# Patient Record
(demographics unavailable — no encounter records)

---
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
## 2025-02-25 NOTE — ADDENDUM
[FreeTextEntry1] : Addendum (Theresa; 2/25/25): I reviewed Ms. Shepherd's prior records from Hematology; Dr. Thomas, last in 8/2020. Prolonged anticoagulation was recommended, most recently with Rivaroxaban. Thrombophilia evaluation was obtained and reportedly negative.

## 2025-02-25 NOTE — REVIEW OF SYSTEMS
[Negative] : Endocrine [TextBox_3] : Fatigue [TextBox_30] : Left pleuritic chest pain [TextBox_104] : LLE injury healing

## 2025-02-25 NOTE — ASSESSMENT
[FreeTextEntry1] : Labs: 4/2024 WBC 5.48, Hgb 12.8, Plts 306 Na 140, K 4.1, Cl 105, HCO3 23, BUN/creat 19/0.61, glucose 96  Radiology: Chest CT 2018: Linear opacity, local pleural thickening Chest CT 1/2024: New 10x6 RUL solid, tubular pulmonary nodule  Chest CT (3/2024): IMPRESSION: 1. Tubular shaped subpleural solid nodule within the lateral aspect of the apical segment of the right upper lobe with an average diameter 8 mm (5:88). Unchanged from 1/21/2024 allowing for differences in imaging technique however more conspicuous in size when compared to 12/23/2018 and new from examination dated 12/7/2016. Further evaluation to include whole-body PET/CT and histological sampling. Minimally short interval surveillance in 3 months is suggested. 2. Stable clustered nodules the largest measuring 5 mm unchanged from 12/23/2018.  PET 4/8/2024 IMPRESSION: 1. Previously identified right upper lobe pulmonary nodule is below the resolution of PET imaging and demonstrates no FDG avidity. 2. Nonspecific prominence of the bilateral inguinal lymph nodes with mild FDG avidity (SUV max 2.3). Limited differential includes infectious/inflammatory and neoplastic etiologies. 3. Mild left maxillary mucosal thickening with moderate FDG avidity, probable chronic sinusitis.  Inguinal US (7/2024): IMPRESSION: Enlarged bilateral inguinal lymph nodes with benign morphology, likely reactive.  Chest CT (10/2024): IMPRESSION: 1.  Since 3/15/2024, there has been no significant change in lung nodules which were not FDG avid on PET scan from 3/28/2024. The right upper lobe nodule could be a true nodule or scar. Recommend follow-up chest CT in 6-12 months to ensure continued stability. 2.  There is a new incompletely visualized density within the left breast. This could represent a clip or calcification. Clinical correlation recommended. Can obtain dedicated imaging of the breasts as clinically indicated.   Chest CT (12/2024): IMPRESSION:  1. Since 10/1/2024, no significant change 8 mm nodular opacity right upper lobe with curvilinear appearance on coronal and sagittal images. This may again represent a focus of scar rather than a true nodule.  2. No change cluster of small tree-in-bud nodules left lower lobe, likely of infectious/inflammatory etiology.   CTA (2/2025): FINDINGS: AIRWAYS/LUNGS/PLEURA: New left apical groundglass opacities with internal septal thickening. No consolidation. No change 8 mm nodular opacity in the lateral aspect of the right upper lobe (6:94). No change cluster of tree-in-bud nodules in the anteromedial basal segment of left lower lobe (6:149). New small left pleural effusion. No right pleural effusion. MEDIASTINUM AND EBER: No lymphadenopathy. PULMONARY ANGIOGRAM: Bilateral pulmonary emboli, in the right interlobular artery arteries extending distally into the middle and lower lobes, right upper lobe segmental/subsegmental arteries, as well as in the distal left pulmonary artery extending distally into the upper and lower lobe segmental arteries. Normal caliber of main pulmonary artery. VESSELS: Aortic calcifications. Coronary artery calcifications. HEART: Dilated right ventricle reflecting right heart strain. No pericardial effusion. VISUALIZED UPPER ABDOMEN: A few hypodensities again seen in left kidney. CHEST WALL AND BONES: Punctate calcification right lobe of thyroid. Punctate clip or calcification in the left breast, unchanged. Degenerative changes.  IMPRESSION: Bilateral pulmonary emboli with evidence of right heart strain. Left apical groundglass opacities with internal septal thickening may be secondary to pulmonary hemorrhage/infarction as well as other etiologies. New small left pleural effusion.  TTE (2/2025): CONCLUSIONS: 1. Normal left and right ventricular size and systolic function. 2. Mild symmetric left ventricular hypertrophy. 3. Grade I left ventricular diastolic dysfunction. 4. Mild tricuspid regurgitation. 5. Pulmonary artery systolic pressure is 27 mmHg. 6. No pericardial effusion.   PFTs Not yet obtained  A/P: 77 yo F h/o recurrent DVT/PE (submassive PE in 2/2025), Psoriatic arthritis, Raynaud's Hypothyroidism, JAMIE, and GERD returns to clinic after a recent hospitalization.  We discussed that Ms. Shepherd  needs to remain on lifelong anticoagulation unless she develops side effects. Due to her recurrent VTE, I think a Hematology evaluation is indicated. I would not expect a treatment change unless Antiphospholipid syndrome is discovered. For secondary evaluation of VTE, she reports being up-to-date on her mammogram. We obtained a PET/CT in 2024. She has not obtained a colonoscopy in the last 10 years. She reports having a sleep study in the past due to fatigue; she does not remember the results.  For the submassive PE and h/o chronic DVT, we will obtain a repeat TTE and CTPE in 3 months to ensure clot resolution and normal RV function.   For her pleuritic chest pain today, I suspect recent musculoskeletal injury or resolving left-sided pleural effusion (likely from pulmonary infarction during her hospitalization). I would like to ensure the effusion is not enlarging or that she has not developed pneumonia.  Ms. Shepherd's pulmonary nodule is stable. Given stability at 3 months, 6 months, and negative PET/CT, I recommended a CT in 1 year.  1. Recurrent DVT/PE 2. Submassive PE in 2/2025 3. RUL solid, incidental pulmonary nodule: low risk for malignancy 4. Inguinal lymphadenopathy in the setting of recent trauma and chronic LE edema 5. Obesity   -continue Apixaban 5 mg PO BID -Hematology consult -Home sleep study -CTPE and TTE in 3 months -CXR -Future visit: GI evaluation for colonoscopy; chest CT 10/2025 -Vaccinations: not discussed today -follow-up with me in 4 weeks
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
## 2025-02-25 NOTE — ASSESSMENT
[FreeTextEntry1] : Labs: 4/2024 WBC 5.48, Hgb 12.8, Plts 306 Na 140, K 4.1, Cl 105, HCO3 23, BUN/creat 19/0.61, glucose 96  Radiology: Chest CT 2018: Linear opacity, local pleural thickening Chest CT 1/2024: New 10x6 RUL solid, tubular pulmonary nodule  Chest CT (3/2024): IMPRESSION: 1. Tubular shaped subpleural solid nodule within the lateral aspect of the apical segment of the right upper lobe with an average diameter 8 mm (5:88). Unchanged from 1/21/2024 allowing for differences in imaging technique however more conspicuous in size when compared to 12/23/2018 and new from examination dated 12/7/2016. Further evaluation to include whole-body PET/CT and histological sampling. Minimally short interval surveillance in 3 months is suggested. 2. Stable clustered nodules the largest measuring 5 mm unchanged from 12/23/2018.  PET 4/8/2024 IMPRESSION: 1. Previously identified right upper lobe pulmonary nodule is below the resolution of PET imaging and demonstrates no FDG avidity. 2. Nonspecific prominence of the bilateral inguinal lymph nodes with mild FDG avidity (SUV max 2.3). Limited differential includes infectious/inflammatory and neoplastic etiologies. 3. Mild left maxillary mucosal thickening with moderate FDG avidity, probable chronic sinusitis.  Inguinal US (7/2024): IMPRESSION: Enlarged bilateral inguinal lymph nodes with benign morphology, likely reactive.  Chest CT (10/2024): IMPRESSION: 1.  Since 3/15/2024, there has been no significant change in lung nodules which were not FDG avid on PET scan from 3/28/2024. The right upper lobe nodule could be a true nodule or scar. Recommend follow-up chest CT in 6-12 months to ensure continued stability. 2.  There is a new incompletely visualized density within the left breast. This could represent a clip or calcification. Clinical correlation recommended. Can obtain dedicated imaging of the breasts as clinically indicated.   Chest CT (12/2024): IMPRESSION:  1. Since 10/1/2024, no significant change 8 mm nodular opacity right upper lobe with curvilinear appearance on coronal and sagittal images. This may again represent a focus of scar rather than a true nodule.  2. No change cluster of small tree-in-bud nodules left lower lobe, likely of infectious/inflammatory etiology.   CTA (2/2025): FINDINGS: AIRWAYS/LUNGS/PLEURA: New left apical groundglass opacities with internal septal thickening. No consolidation. No change 8 mm nodular opacity in the lateral aspect of the right upper lobe (6:94). No change cluster of tree-in-bud nodules in the anteromedial basal segment of left lower lobe (6:149). New small left pleural effusion. No right pleural effusion. MEDIASTINUM AND EBER: No lymphadenopathy. PULMONARY ANGIOGRAM: Bilateral pulmonary emboli, in the right interlobular artery arteries extending distally into the middle and lower lobes, right upper lobe segmental/subsegmental arteries, as well as in the distal left pulmonary artery extending distally into the upper and lower lobe segmental arteries. Normal caliber of main pulmonary artery. VESSELS: Aortic calcifications. Coronary artery calcifications. HEART: Dilated right ventricle reflecting right heart strain. No pericardial effusion. VISUALIZED UPPER ABDOMEN: A few hypodensities again seen in left kidney. CHEST WALL AND BONES: Punctate calcification right lobe of thyroid. Punctate clip or calcification in the left breast, unchanged. Degenerative changes.  IMPRESSION: Bilateral pulmonary emboli with evidence of right heart strain. Left apical groundglass opacities with internal septal thickening may be secondary to pulmonary hemorrhage/infarction as well as other etiologies. New small left pleural effusion.  TTE (2/2025): CONCLUSIONS: 1. Normal left and right ventricular size and systolic function. 2. Mild symmetric left ventricular hypertrophy. 3. Grade I left ventricular diastolic dysfunction. 4. Mild tricuspid regurgitation. 5. Pulmonary artery systolic pressure is 27 mmHg. 6. No pericardial effusion.   PFTs Not yet obtained  A/P: 79 yo F h/o recurrent DVT/PE (submassive PE in 2/2025), Psoriatic arthritis, Raynaud's Hypothyroidism, JAMIE, and GERD returns to clinic after a recent hospitalization.  We discussed that Ms. Shepherd  needs to remain on lifelong anticoagulation unless she develops side effects. Due to her recurrent VTE, I think a Hematology evaluation is indicated. I would not expect a treatment change unless Antiphospholipid syndrome is discovered. For secondary evaluation of VTE, she reports being up-to-date on her mammogram. We obtained a PET/CT in 2024. She has not obtained a colonoscopy in the last 10 years. She reports having a sleep study in the past due to fatigue; she does not remember the results.  For the submassive PE and h/o chronic DVT, we will obtain a repeat TTE and CTPE in 3 months to ensure clot resolution and normal RV function.   For her pleuritic chest pain today, I suspect recent musculoskeletal injury or resolving left-sided pleural effusion (likely from pulmonary infarction during her hospitalization). I would like to ensure the effusion is not enlarging or that she has not developed pneumonia.  Ms. Shepherd's pulmonary nodule is stable. Given stability at 3 months, 6 months, and negative PET/CT, I recommended a CT in 1 year.  1. Recurrent DVT/PE 2. Submassive PE in 2/2025 3. RUL solid, incidental pulmonary nodule: low risk for malignancy 4. Inguinal lymphadenopathy in the setting of recent trauma and chronic LE edema 5. Obesity   -continue Apixaban 5 mg PO BID -Hematology consult -Home sleep study -CTPE and TTE in 3 months -CXR -Future visit: GI evaluation for colonoscopy; chest CT 10/2025 -Vaccinations: not discussed today -follow-up with me in 4 weeks

## 2025-02-26 NOTE — HISTORY OF PRESENT ILLNESS
[de-identified] : 78F with hx h/o femoral DVT and PE in 2016 (not on AC) with reccurent hx 02/2025, Psoriatic arthritis, Raynaud's, hypothyroidism, chronic fatigue, JAMIE (recent sleep study at [place]), GERD, goiter, UTI.  Symptoms 2 weeks prior to [facility] visit 2 weeks ago 2016- prior with sx of back pain. No SOB, chest pain, palpitations. Report chronic hx of b.l leg pain s/p Eliquis. Wil recurrent episode 2019. Diagnosed with thrombophlebitis 1971 after puncture wound.on AC - heparin for 3 weeks Hx of recurrent DVT/PEs. No hx of miscarriages.   Medications: Eliquis, (RX by Dr. Cardenas) Allergies: amoxicillin, celebrex, compazine, Enbrel, Humira Social hx: No tobacco use, social alcohol use Family hx: Mother (CAD; unsure hx of DVT) Surgery: dental extractions, tonsillectomy, appendectomy

## 2025-03-01 NOTE — REVIEW OF SYSTEMS
[Negative] : Endocrine [TextBox_3] : Fatigue [TextBox_30] : Left pleuritic chest pain [TextBox_94] : Left lower leg and foot swelling and pain.

## 2025-03-01 NOTE — ASSESSMENT
[FreeTextEntry1] : Labs: 4/2024 WBC 5.48, Hgb 12.8, Plts 306 Na 140, K 4.1, Cl 105, HCO3 23, BUN/creat 19/0.61, glucose 96  Radiology: Chest CT 2018: Linear opacity, local pleural thickening Chest CT 1/2024: New 10x6 RUL solid, tubular pulmonary nodule  Chest CT (3/2024): IMPRESSION: 1. Tubular shaped subpleural solid nodule within the lateral aspect of the apical segment of the right upper lobe with an average diameter 8 mm (5:88). Unchanged from 1/21/2024 allowing for differences in imaging technique however more conspicuous in size when compared to 12/23/2018 and new from examination dated 12/7/2016. Further evaluation to include whole-body PET/CT and histological sampling. Minimally short interval surveillance in 3 months is suggested. 2. Stable clustered nodules the largest measuring 5 mm unchanged from 12/23/2018.  PET 4/8/2024 IMPRESSION: 1. Previously identified right upper lobe pulmonary nodule is below the resolution of PET imaging and demonstrates no FDG avidity. 2. Nonspecific prominence of the bilateral inguinal lymph nodes with mild FDG avidity (SUV max 2.3). Limited differential includes infectious/inflammatory and neoplastic etiologies. 3. Mild left maxillary mucosal thickening with moderate FDG avidity, probable chronic sinusitis.  Inguinal US (7/2024): IMPRESSION: Enlarged bilateral inguinal lymph nodes with benign morphology, likely reactive.  Chest CT (10/2024): IMPRESSION: 1.  Since 3/15/2024, there has been no significant change in lung nodules which were not FDG avid on PET scan from 3/28/2024. The right upper lobe nodule could be a true nodule or scar. Recommend follow-up chest CT in 6-12 months to ensure continued stability. 2.  There is a new incompletely visualized density within the left breast. This could represent a clip or calcification. Clinical correlation recommended. Can obtain dedicated imaging of the breasts as clinically indicated.  Chest CT (12/2024): IMPRESSION:  1. Since 10/1/2024, no significant change 8 mm nodular opacity right upper lobe with curvilinear appearance on coronal and sagittal images. This may again represent a focus of scar rather than a true nodule.  2. No change cluster of small tree-in-bud nodules left lower lobe, likely of infectious/inflammatory etiology.   CTA (2/2025): FINDINGS: AIRWAYS/LUNGS/PLEURA: New left apical groundglass opacities with internal septal thickening. No consolidation. No change 8 mm nodular opacity in the lateral aspect of the right upper lobe (6:94). No change cluster of tree-in-bud nodules in the anteromedial basal segment of left lower lobe (6:149). New small left pleural effusion. No right pleural effusion. MEDIASTINUM AND EBER: No lymphadenopathy. PULMONARY ANGIOGRAM: Bilateral pulmonary emboli, in the right interlobular artery arteries extending distally into the middle and lower lobes, right upper lobe segmental/subsegmental arteries, as well as in the distal left pulmonary artery extending distally into the upper and lower lobe segmental arteries. Normal caliber of main pulmonary artery. VESSELS: Aortic calcifications. Coronary artery calcifications. HEART: Dilated right ventricle reflecting right heart strain. No pericardial effusion. VISUALIZED UPPER ABDOMEN: A few hypodensities again seen in left kidney. CHEST WALL AND BONES: Punctate calcification right lobe of thyroid. Punctate clip or calcification in the left breast, unchanged. Degenerative changes.  IMPRESSION: Bilateral pulmonary emboli with evidence of right heart strain. Left apical groundglass opacities with internal septal thickening may be secondary to pulmonary hemorrhage/infarction as well as other etiologies. New small left pleural effusion.  TTE (2/2025): CONCLUSIONS: 1. Normal left and right ventricular size and systolic function. 2. Mild symmetric left ventricular hypertrophy. 3. Grade I left ventricular diastolic dysfunction. 4. Mild tricuspid regurgitation. 5. Pulmonary artery systolic pressure is 27 mmHg. 6. No pericardial effusion.   PFTs Not yet obtained  A/P: 77 yo F h/o recurrent DVT/PE (submassive PE, intermediate-low risk; 2/2025), Psoriatic arthritis, Raynaud's Hypothyroidism, JAMIE, and GERD returns to clinic after a recent hospitalization.  Ms. Shepherd inquired about bunion surgery due to L foot pain. We discussed that she should not interrupt her anticoagulation for at least 3 months to ensure that her DVT and PEs resolve. I agree with her evaluation by a podiatrist.   Ms. Shepherd saw Dr. Ramirez, who recommended against hypercoagulability testing now due to her concurrent anticoagulation. She agreed with lifelong anticoagulation and monitoring for side effects.  For the submassive PE and h/o chronic DVT, we will obtain a repeat TTE and CTPE in 3 months to ensure clot resolution and normal RV function.   Ms. Shepherd's pulmonary nodule is stable. Given stability at 3 months, 6 months, and negative PET/CT, we will obtain a chest CT in 1 year (2/2026).  1. Recurrent DVT/PE 2. Submassive PE in 2/2025 3. RUL solid, incidental pulmonary nodule: low risk for malignancy 4. Inguinal lymphadenopathy in the setting of recent trauma and chronic LE edema 5. Housing instability  -appreciate Hematology consult -continue Apixaban 5 mg PO BID; I provided 2 weeks of medication today (while she is awaiting a patient assistant program) -Home sleep study -CTPE and TTE in 3 months (5/2025) -Future visit: GI evaluation for colonoscopy; chest CT in 1 year (after CTPE) to monitor lung nodule -Vaccinations: not discussed today -follow-up with me on 3/19/25

## 2025-03-01 NOTE — PHYSICAL EXAM
[No Acute Distress] : no acute distress [Normal Appearance] : normal appearance [Normal Rate/Rhythm] : normal rate/rhythm [No Murmurs] : no murmurs [Normal S1, S2] : normal s1, s2 [No Resp Distress] : no resp distress [Clear to Auscultation Bilaterally] : clear to auscultation bilaterally [No Abnormalities] : no abnormalities [Benign] : benign [Normal Gait] : normal gait [No Clubbing] : no clubbing [No Cyanosis] : no cyanosis [FROM] : FROM [Normal Color/ Pigmentation] : normal color/ pigmentation [No Focal Deficits] : no focal deficits [Oriented x3] : oriented x3 [Normal Affect] : normal affect [TextBox_11] : NC/AT [TextBox_105] : L>R lower extremity edema
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
## 2025-03-21 NOTE — ASSESSMENT
[FreeTextEntry1] : Labs: 4/2024 WBC 5.48, Hgb 12.8, Plts 306 Na 140, K 4.1, Cl 105, HCO3 23, BUN/creat 19/0.61, glucose 96  Radiology: Chest CT 2018: Linear opacity, local pleural thickening Chest CT 1/2024: New 10x6 RUL solid, tubular pulmonary nodule  Chest CT (3/2024): IMPRESSION: 1. Tubular shaped subpleural solid nodule within the lateral aspect of the apical segment of the right upper lobe with an average diameter 8 mm (5:88). Unchanged from 1/21/2024 allowing for differences in imaging technique however more conspicuous in size when compared to 12/23/2018 and new from examination dated 12/7/2016. Further evaluation to include whole-body PET/CT and histological sampling. Minimally short interval surveillance in 3 months is suggested. 2. Stable clustered nodules the largest measuring 5 mm unchanged from 12/23/2018.  PET 4/8/2024 IMPRESSION: 1. Previously identified right upper lobe pulmonary nodule is below the resolution of PET imaging and demonstrates no FDG avidity. 2. Nonspecific prominence of the bilateral inguinal lymph nodes with mild FDG avidity (SUV max 2.3). Limited differential includes infectious/inflammatory and neoplastic etiologies. 3. Mild left maxillary mucosal thickening with moderate FDG avidity, probable chronic sinusitis.  Inguinal US (7/2024): IMPRESSION: Enlarged bilateral inguinal lymph nodes with benign morphology, likely reactive.  Chest CT (10/2024): IMPRESSION: 1.  Since 3/15/2024, there has been no significant change in lung nodules which were not FDG avid on PET scan from 3/28/2024. The right upper lobe nodule could be a true nodule or scar. Recommend follow-up chest CT in 6-12 months to ensure continued stability. 2.  There is a new incompletely visualized density within the left breast. This could represent a clip or calcification. Clinical correlation recommended. Can obtain dedicated imaging of the breasts as clinically indicated.  Chest CT (12/2024): IMPRESSION:  1. Since 10/1/2024, no significant change 8 mm nodular opacity right upper lobe with curvilinear appearance on coronal and sagittal images. This may again represent a focus of scar rather than a true nodule.  2. No change cluster of small tree-in-bud nodules left lower lobe, likely of infectious/inflammatory etiology.   CTA (2/2025): FINDINGS: AIRWAYS/LUNGS/PLEURA: New left apical groundglass opacities with internal septal thickening. No consolidation. No change 8 mm nodular opacity in the lateral aspect of the right upper lobe (6:94). No change cluster of tree-in-bud nodules in the anteromedial basal segment of left lower lobe (6:149). New small left pleural effusion. No right pleural effusion. IMPRESSION: Bilateral pulmonary emboli with evidence of right heart strain. Left apical groundglass opacities with internal septal thickening may be secondary to pulmonary hemorrhage/infarction as well as other etiologies. New small left pleural effusion.  TTE (2/2025): CONCLUSIONS: 1. Normal left and right ventricular size and systolic function. 2. Mild symmetric left ventricular hypertrophy. 3. Grade I left ventricular diastolic dysfunction. 4. Mild tricuspid regurgitation. 5. Pulmonary artery systolic pressure is 27 mmHg. 6. No pericardial effusion.   PFTs Not yet obtained  A/P: 79 yo F h/o recurrent DVT/PE (submassive PE, intermediate-low risk; 2/2025), Psoriatic arthritis, Raynaud's Hypothyroidism, JAMIE, and GERD returns to clinic after a recent hospitalization.  Ms. Shepherd feels well today. She does not remember her last colonoscopy. We discussed that colon cancer screening is recommended since GI bleeding can be associated with colon tumors. She is re-establishing primary care at [facility] and will consider.   For her submassive PE, she has had difficulty obtaining and affording Apixaban. [facility] be able to support her.   Ms. Shepherd saw Dr. Ramirez, who recommended against hypercoagulability testing now due to her concurrent anticoagulation. She agreed with lifelong anticoagulation and monitoring for side effects.  Ms. Shepherd's pulmonary nodule is stable. Given stability at 3 months, 6 months, and negative PET/CT, we will obtain a chest CT in 1 year (2/2026).  1. Recurrent DVT/PE 2. Submassive PE in 2/2025 3. RUL solid, incidental pulmonary nodule: low risk for malignancy 4. Inguinal lymphadenopathy in the setting of recent trauma and chronic LE edema 5. Housing instability  -appreciate Hematology consult -continue Apixaban 5 mg PO BID; I provided 2 weeks of medication today (while she is awaiting a patient assistant program); samples for 2 weeks were provided -repeat chest CT and TTE in 5/2025 -Home sleep study -Future visit: GI evaluation for colonoscopy; chest CT in 1 year (after CTPE) to monitor lung nodule -Vaccinations: not discussed today -follow-up with Dr. Fallon in 3 months

## 2025-03-21 NOTE — PHYSICAL EXAM
[No Acute Distress] : no acute distress [Normal Appearance] : normal appearance [Normal Rate/Rhythm] : normal rate/rhythm [Normal S1, S2] : normal s1, s2 [No Murmurs] : no murmurs [No Resp Distress] : no resp distress [Clear to Auscultation Bilaterally] : clear to auscultation bilaterally [No Abnormalities] : no abnormalities [Benign] : benign [Normal Gait] : normal gait [No Clubbing] : no clubbing [No Cyanosis] : no cyanosis [FROM] : FROM [Normal Color/ Pigmentation] : normal color/ pigmentation [No Focal Deficits] : no focal deficits [Oriented x3] : oriented x3 [Normal Affect] : normal affect [TextBox_11] : NC/AT [TextBox_105] : L>R lower extremity edema
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
## 2025-03-21 NOTE — REVIEW OF SYSTEMS
Patient will come out [Negative] : Endocrine [TextBox_3] : Fatigue [TextBox_94] : Left lower leg and foot swelling and pain.

## 2025-04-05 NOTE — ASSESSMENT
[FreeTextEntry1] : Labs: 4/2024 WBC 5.48, Hgb 12.8, Plts 306 Na 140, K 4.1, Cl 105, HCO3 23, BUN/creat 19/0.61, glucose 96  Radiology: Chest CT 2018: Linear opacity, local pleural thickening Chest CT 1/2024: New 10x6 RUL solid, tubular pulmonary nodule  Chest CT (3/2024): IMPRESSION: 1. Tubular shaped subpleural solid nodule within the lateral aspect of the apical segment of the right upper lobe with an average diameter 8 mm (5:88). Unchanged from 1/21/2024 allowing for differences in imaging technique however more conspicuous in size when compared to 12/23/2018 and new from examination dated 12/7/2016. Further evaluation to include whole-body PET/CT and histological sampling. Minimally short interval surveillance in 3 months is suggested. 2. Stable clustered nodules the largest measuring 5 mm unchanged from 12/23/2018.  PET 4/8/2024 IMPRESSION: 1. Previously identified right upper lobe pulmonary nodule is below the resolution of PET imaging and demonstrates no FDG avidity. 2. Nonspecific prominence of the bilateral inguinal lymph nodes with mild FDG avidity (SUV max 2.3). Limited differential includes infectious/inflammatory and neoplastic etiologies. 3. Mild left maxillary mucosal thickening with moderate FDG avidity, probable chronic sinusitis.  Inguinal US (7/2024): IMPRESSION: Enlarged bilateral inguinal lymph nodes with benign morphology, likely reactive.  Chest CT (10/2024): IMPRESSION: 1.  Since 3/15/2024, there has been no significant change in lung nodules which were not FDG avid on PET scan from 3/28/2024. The right upper lobe nodule could be a true nodule or scar. Recommend follow-up chest CT in 6-12 months to ensure continued stability. 2.  There is a new incompletely visualized density within the left breast. This could represent a clip or calcification. Clinical correlation recommended. Can obtain dedicated imaging of the breasts as clinically indicated.  Chest CT (12/2024): IMPRESSION:  1. Since 10/1/2024, no significant change 8 mm nodular opacity right upper lobe with curvilinear appearance on coronal and sagittal images. This may again represent a focus of scar rather than a true nodule.  2. No change cluster of small tree-in-bud nodules left lower lobe, likely of infectious/inflammatory etiology.   CTA (2/2025): FINDINGS: AIRWAYS/LUNGS/PLEURA: New left apical groundglass opacities with internal septal thickening. No consolidation. No change 8 mm nodular opacity in the lateral aspect of the right upper lobe (6:94). No change cluster of tree-in-bud nodules in the anteromedial basal segment of left lower lobe (6:149). New small left pleural effusion. No right pleural effusion. IMPRESSION: Bilateral pulmonary emboli with evidence of right heart strain. Left apical groundglass opacities with internal septal thickening may be secondary to pulmonary hemorrhage/infarction as well as other etiologies. New small left pleural effusion.  TTE (2/2025): CONCLUSIONS: 1. Normal left and right ventricular size and systolic function. 2. Mild symmetric left ventricular hypertrophy. 3. Grade I left ventricular diastolic dysfunction. 4. Mild tricuspid regurgitation. 5. Pulmonary artery systolic pressure is 27 mmHg. 6. No pericardial effusion.   PFTs Not yet obtained  A/P: 79 yo F h/o recurrent DVT/PE (submassive PE, intermediate-low risk; 2/2025), Psoriatic arthritis, Raynaud's Hypothyroidism, JAMIE, and GERD returns to clinic after a recent hospitalization.  Ms. Shepherd feels well today. Her medications have become more affordable, and she is taking the DOAC consistently. She does have some exertional dyspnea that developed after her hospitalization for PE. I agree that would benefit from pulmonary rehabilitation to improve her exercise tolerance. I would like to obtain PFT to better characterize her pulmonary function. We are planning a CTPE in 5/2025.  Regarding precipitants for her recurrent DVT/PE, [facility].   Ms. Shepherd's pulmonary nodule is stable. Given stability at 3 months, 6 months, and negative PET/CT, we will obtain a chest CT in 1 year (2/2026).  1. Recurrent DVT/PE 2. Submassive PE in 2/2025 3. RUL solid, incidental pulmonary nodule: low risk for malignancy 4. Inguinal lymphadenopathy in the setting of recent trauma and chronic LE edema 5. Housing instability  -appreciate Hematology consult -continue Apixaban 5 mg PO BID; anticipated extended course -repeat chest CT and TTE in 5/2025 -Home sleep study -Pulmonary rehabilitation consult -re-request PFTs -Future visit: GI evaluation for colonoscopy; chest CT in 1 year (after CTPE) to monitor lung nodule -Vaccinations: not discussed today -follow-up with Dr. Fallon in 3 months
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
## 2025-04-24 NOTE — ASSESSMENT
[FreeTextEntry1] : Labs: 4/2024 WBC 5.48, Hgb 12.8, Plts 306 Na 140, K 4.1, Cl 105, HCO3 23, BUN/creat 19/0.61, glucose 96  Radiology: Chest CT 2018: Linear opacity, local pleural thickening Chest CT 1/2024: New 10x6 RUL solid, tubular pulmonary nodule  Chest CT (3/2024): IMPRESSION: 1. Tubular shaped subpleural solid nodule within the lateral aspect of the apical segment of the right upper lobe with an average diameter 8 mm (5:88). Unchanged from 1/21/2024 allowing for differences in imaging technique however more conspicuous in size when compared to 12/23/2018 and new from examination dated 12/7/2016. Further evaluation to include whole-body PET/CT and histological sampling. Minimally short interval surveillance in 3 months is suggested. 2. Stable clustered nodules the largest measuring 5 mm unchanged from 12/23/2018.  PET 4/8/2024 IMPRESSION: 1. Previously identified right upper lobe pulmonary nodule is below the resolution of PET imaging and demonstrates no FDG avidity. 2. Nonspecific prominence of the bilateral inguinal lymph nodes with mild FDG avidity (SUV max 2.3). Limited differential includes infectious/inflammatory and neoplastic etiologies. 3. Mild left maxillary mucosal thickening with moderate FDG avidity, probable chronic sinusitis.  Inguinal US (7/2024): IMPRESSION: Enlarged bilateral inguinal lymph nodes with benign morphology, likely reactive.  Chest CT (10/2024): IMPRESSION: 1.  Since 3/15/2024, there has been no significant change in lung nodules which were not FDG avid on PET scan from 3/28/2024. The right upper lobe nodule could be a true nodule or scar. Recommend follow-up chest CT in 6-12 months to ensure continued stability. 2.  There is a new incompletely visualized density within the left breast. This could represent a clip or calcification. Clinical correlation recommended. Can obtain dedicated imaging of the breasts as clinically indicated.  Chest CT (12/2024): IMPRESSION:  1. Since 10/1/2024, no significant change 8 mm nodular opacity right upper lobe with curvilinear appearance on coronal and sagittal images. This may again represent a focus of scar rather than a true nodule.  2. No change cluster of small tree-in-bud nodules left lower lobe, likely of infectious/inflammatory etiology.   CTA (2/2025): FINDINGS: AIRWAYS/LUNGS/PLEURA: New left apical groundglass opacities with internal septal thickening. No consolidation. No change 8 mm nodular opacity in the lateral aspect of the right upper lobe (6:94). No change cluster of tree-in-bud nodules in the anteromedial basal segment of left lower lobe (6:149). New small left pleural effusion. No right pleural effusion. IMPRESSION: Bilateral pulmonary emboli with evidence of right heart strain. Left apical groundglass opacities with internal septal thickening may be secondary to pulmonary hemorrhage/infarction as well as other etiologies. New small left pleural effusion.  TTE (2/2025): CONCLUSIONS: 1. Normal left and right ventricular size and systolic function. 2. Mild symmetric left ventricular hypertrophy. 3. Grade I left ventricular diastolic dysfunction. 4. Mild tricuspid regurgitation. 5. Pulmonary artery systolic pressure is 27 mmHg. 6. No pericardial effusion.   PFTs: attempted 4/2025; unable to obtain  A/P: 79 yo F h/o recurrent DVT/PE (submassive PE, intermediate-low risk; 2/2025), Psoriatic arthritis, Raynaud's Hypothyroidism, JAMIE, and GERD returns to clinic.  Ms. Shepherd feels more fatigued today and has had intermittent cough. Given the probable multi-system nature of her symptoms, I recommended evaluation by her primary care physician. She requests a second opinion from primary care at [facility].  For her breathing status, she remains on the Apixaban and is scheduled for PFTs in [place] in 2 weeks.  We are planning a CTPE in 5/2025 to ensure clot resolution.  Regarding precipitants for her recurrent DVT/PE, she has established primary care at [facility] and will consider colonoscopy.   Ms. Shepherd's pulmonary nodule is stable. Given stability at 3 months, 6 months, and negative PET/CT, we will obtain a chest CT in 1 year (? 5/2026).  1. Recurrent DVT/PE 2. Submassive PE in 2/2025 3. RUL solid, incidental pulmonary nodule: low risk for malignancy 4. Inguinal lymphadenopathy in the setting of recent trauma and chronic LE edema 5. Housing instability  -appreciate Hematology consult -continue Apixaban 5 mg PO BID; anticipated extended course -repeat chest CT and TTE in 5/2025 -Home sleep study pending -Pulmonary rehabilitation pending -PFTs pending -Future visit: GI evaluation for colonoscopy; chest CT in 1 year (after CTPE) to monitor lung nodule -for a primary care evaluation at [facility], she will call our front office for clinic numbers -Vaccinations: not discussed today -follow-up with Dr. Fallon in 3 months

## 2025-05-15 NOTE — PROCEDURE
[Image(s) Captured] : image(s) captured and filed [Unable to Cooperate with Mirror] : patient unable to cooperate with mirror [Gag Reflex] : gag reflex preventing mirror examination [Hoarseness] : hoarseness not clearly evaluated by indirect laryngoscopy [Complicated Symptoms] : complicated symptoms requiring more thorough examination than provided by mirror [Globus] : globus [Topical Lidocaine] : topical lidocaine [Oxymetazoline HCl] : oxymetazoline HCl [Flexible Endoscope] : examined with the flexible endoscope [Serial Number: ___] : Serial Number: [unfilled] [Cerumen Impaction] : Cerumen Impaction [Same] : same as the Pre Op Dx. [de-identified] : Verbal informed consent was obtained for fiber optic laryngoscopy.  Findings: The nasal septum is minimally deviated to the left. There are no masses or polyps, and the nasal mucosa and secretions are normal. The choanae and posterior nasopharynx are normal without masses or drainage. The Eustachian tube orifices appear patent. The pharynx, including the posterior and lateral pharyngeal walls, the vallecula and base of tongue are normal without ulcerations, lesions or masses. The hypopharynx including the pyriform sinuses open well without pooling of secretions, mucosal lesions or masses. The supraglottic larynx including the epiglottis, petiole, arytenoids, glossoepiglottic, aryepiglottic and pharyngoepiglottic folds are normal without mucosal lesions, ulcerations or masses. The glottis reveals normal false vocal folds. The true vocal folds are glistening white, tense and of equal length, without paralysis, having symmetric mobility on adduction and abduction. There are no mucosal lesions, nodules, cysts, erythroplasia or leukoplakia. The posterior cricoid area has healthy pink mucosa in the interarytenoid area and esophageal inlet. There is mild thickening/pachydermia of the interarytenoid mucosa suggestive of posterior laryngitis from laryngopharyngeal acid reflux disease. The trachea is clear without narrowing in the immediate subglottic region, without deviation or lesions. ------------------------------------------------------------------------------------------- [de-identified] : nontoxic MNG, mild compressive symptoms [FreeTextEntry1] : bilateral hearing loss and cerumen impaction [FreeTextEntry6] : Copious cerumen removed from ear canals with instrumentation.  TMs are intact with normal mobility and improved hearing to finger rub.
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
## 2025-05-15 NOTE — CONSULT LETTER
[Dear  ___] : Dear  [unfilled], [Consult Letter:] : I had the pleasure of evaluating your patient, [unfilled]. [Please see my note below.] : Please see my note below. [Consult Closing:] : Thank you very much for allowing me to participate in the care of this patient.  If you have any questions, please do not hesitate to contact me. [Sincerely,] : Sincerely, [FreeTextEntry3] :  Harsha Lewis M.D., FACS, [facility] Director [facility] at [facility] Certified in Thyroid/Parathyroid/Neck Ultrasound, TODD/ TAMRA , Department of Otolaryngology [facility] at [facility]   [DrAbril  ___] : Dr. MOORE

## 2025-05-15 NOTE — REASON FOR VISIT
[FreeTextEntry2] : a surgical consultation concerning a NTMNG, mild compressive symptoms, possible OSAS, globus. [FreeTextEntry1] : Referred by Mary Samuel MD Endocrinologist, PCP is [facility]

## 2025-05-15 NOTE — PHYSICAL EXAM
[TextEntry] : Focused Head and Neck Examination:  General Appearance: The patient has a normal appearance (head and face), able to communicate with normal speech and is a well-groomed, well-developed, well-nourished, mildly overweight female in no apparent distress.  Breathing was silent and not labored. The patient was alert had normal affect and oriented to person, place, and time. The patient had normal facial and neck skin with normal facial symmetry, strength and motion, no visible/ palpable facial masses or sinus tenderness.  Otologic Exam: The pinnae and bilateral external ear canals were without lesions and clear after bilateral cerumen removal.  The tympanic membranes were normal bilaterally without perforation and demonstrated normal mobility. There was no evidence of middle ear effusion or infection. Hearing is grossly normal to finger rub.   External Nasal Exam: The external nose was normal in appearance without lesions or deformity.    Intranasal Exam: There were no mucosal lesions, discolorations, nasal polyps, or masses. The nasal septum was intact without perforations. The nasal septum was deviated slightly to the left. The inferior turbinates were normal. The middle turbinates were normal bilaterally.  The middle meatus was clear, and the secretions were normal.  Nasopharynx: There were no visible masses, mucosal ulcerations, or other lesions.  Secretions were normal.   Oral cavity: The patient's lips and vermillion border were normal without lesions, ulcerations or discolorations. Dentition was poor with multiple carious teeth and extractions, the gums were normal, the oral mucosa was normal and there were no lesions, discolorations, ulcerations, erythema, or leukoplakia.  The floor of mouth was without lesions or palpable calculi. The oral tongue has normal mobility, without palpable or visible lesions, ulcerations, nodularity or tenderness.  All surfaces including lateral, ventral and dorsal mucosa examined and palpated. Expressed salivary flow was normal.  Stensens ducts are without palpable calculi.    Oropharynx: The tongue base was without palpable lesions, the soft palate was normal without lesions, the hard palate was normal without lesions, ulcerations, nodularity or discolorations.  The palatine tonsils have been removed, and the lingual tonsils were normal.  Pharynx: The lateral and posterior pharyngeal walls were intact without mucosal lesions, discolorations, ulcerations, or masses.    Larynx and Hypopharynx: Flexible fiber optic laryngoscopy was performed with topical anesthesia using 4% lidocaine and 0.5 % Oxymetazoline on cotton pledgets. More details of the exam are outlined in my procedure note but this examination revealed normal, symmetric vocal fold mobility and normal mucosa without vocal fold lesions, discolorations, or ulcerations.  There was mild to moderate posterior pachydermia consistent with GERD.  The epiglottis and false vocal folds were normal without mucosal lesions. The piriform sinuses opened well and there were no lesions or pooling of saliva. The trachea was clear without narrowing in the immediate subglottic area and in midline position without lesions.    Neck Exam: There was no palpable lymphadenopathy or bruits in the central or lateral adam drainage basins levels I-VI.  There was no asymmetry, pulsatile masses or nodules. The parotid and submandibular glands were not enlarged or tender and without palpable nodules or mass.  The temporomandibular joints were normal bilaterally without deviation/subluxation/click/tenderness or crepitus to palpation.  There is tenderness and muscle spasm over the left paraspinal muscles.   Thyroid Examination: The thyroid gland was not enlarged, nontender and without palpable nodules.  Neurological Exam: Cranial nerves II through XII were intact.  There was no visible tremor. Gross motor and sensory functions are normal.  A Chvostek sign was not present.  Ocular Exam: Pupils were equal and responsive to light.  A cataract is noted in the right eye but absent in the left.  Extraocular movements and gaze were normal. The sclera and conjunctiva were normal and anicteric.  Nystagmus was absent. No sign of erythema, ulcerations or lesions.    Respiratory: Respiratory effort is normal with symmetric chest expansion and no retractions or use of accessory muscles.    Cardiovascular: Extremities are normal with strong pulses, normal temperature, and no edema.

## 2025-05-15 NOTE — DATA REVIEWED
[de-identified] : see HPI  [de-identified] : see HPI  [de-identified] : Endocrinologist notes reviewed.

## 2025-05-16 NOTE — END OF VISIT
[Time Spent: ___ minutes] : I have spent [unfilled] minutes of time on the encounter which excludes teaching and separately reported services. Type Of Destruction Used: Curettage

## 2025-05-20 NOTE — ASSESSMENT
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 - Given worsening symptoms of fatigue needs repeat sleep study - Does not have housing at this time to undergo home sleep study, will try for in lab  RTC 3 months

## 2025-05-20 NOTE — HISTORY OF PRESENT ILLNESS
[TextBox_4] : 79 yo F h/o recurrent VTE (DVT and PE; most recently in 2/2025), Psoriatic arthritis, Raynaud's, hypothyroidism, chronic fatigue, JAMIE (recent sleep study at [facility]), GERD, goiter, UTI. Ms. Shepherd was seen by Dr. Mendenhall in 1/2024 for a RUL lung nodule 8 mm in size. Her visit was additionally notable for cough for which she was treated with nasal ICS. A repeat CT chest was obtained in 3/2024; in my review with thoracic radiology, the RUL 8 mm nodule appeared new compared to prior but stable between 1/2024 and 3/2024.  Patient showed up without an appointment. She needed a tooth repaired last week and had to go off Eliquis temporarily. She has overwhelming fatigue and can't function during the day. Is worried about post PE syndrome. She had a sleep study at [facility] a long time ago and does not have the results.   Needs emergency financial assistance to pay for her storage because she is behind in her payments. Has been significantly limited by her chronic fatigue and leg pain.

## 2025-05-23 NOTE — PHYSICAL EXAM
[Alert] : alert [No Acute Distress] : no acute distress [Normal Voice/Communication] : normal voice communication [Normal Hearing] : hearing was normal [No Respiratory Distress] : no respiratory distress [Normal Rate and Effort] : normal respiratory rate and effort [Oriented x3] : oriented to person, place, and time [Normal Affect] : the affect was normal [Normal Insight/Judgement] : insight and judgment were intact [Normal Mood] : the mood was normal [de-identified] : Ambulates with cart

## 2025-05-23 NOTE — PHYSICAL EXAM
[Alert] : alert [No Acute Distress] : no acute distress [Normal Voice/Communication] : normal voice communication [Normal Hearing] : hearing was normal [No Respiratory Distress] : no respiratory distress [Normal Rate and Effort] : normal respiratory rate and effort [Oriented x3] : oriented to person, place, and time [Normal Affect] : the affect was normal [Normal Insight/Judgement] : insight and judgment were intact [Normal Mood] : the mood was normal [de-identified] : Ambulates with cart

## 2025-05-23 NOTE — HISTORY OF PRESENT ILLNESS
[FreeTextEntry1] : Ms. ESPARZA is a 77 year old female with pmhx of hypothyroidism, psoriatic arthritis and thromboembolic disease who presents for follow-up.  ***Patient refused to come back to exam room until completed phone call, creating late start to visit, so modified time of visit.  Additionally refused assistance from multiple MA staff***  Patient of Dr Samuel, last (initial) visit, 12/16/23 Last (initial) visit with myself-- 02/13/2024  Interval change: Since last visit, continues levothyroxine 100mcg QAM and discontinued Liothyronine regimen Has been getting refills by PCP, no recent TFTs, per patient Most recent TFTs provided by patient from July 2024 [facility], as below. On file, most recently from April 2024 Hospitalized in February 2025 for PE Endorses ++ fatigue.  Same since last year, not worsened Patient is concerned of cortisol as etiology of fatigue, which she has not yet evaluated from order provided with last visit. Endorses better sleep patterns that she is waking earlier and desires proceeding for morning labs Endorses need for dental work and follow-up, "bad teeth" with broken tooth yesterday, uncertain of need for extraction/implantation   1. Osteoporosis Postmenopausal since  12/2023 Diagnosed with osteoporosis from DEXA in 12/2023 with   Does not believe having been diagnosed in the past or prior DEXA  No history of fracture  No past treatment   Current calcium and vitamin D intake includes: Dietary sources: "I am a milk drinker", but seeing if dairy upsets GI tract (increase bm) + cheese daily Supplements: vitD, supplements daily  Weight bearing exercise includes walking.  Osteoporosis risk factors include: Postmenopausal status,  race, prior fracture, falls, height loss, small thin bones, tobacco use, excessive alcohol, anorexia, family history, vitamin D deficiency, corticosteroid use, seizure medications, malabsorption, hyperparathyroidism, hyperthyroidism. NEGATIVE EXCEPT: postmenopausal status,  race   2. Hypothyroidism She was diagnosed with thyrotoxicosis during college in the setting of fatigue and weight gain and received propylthiouracil for a period of time. She was subsequently diagnosed with hypothyroidism in her fifties in [place]. She has been taking levothyroxine 100 mcg daily since October 2023, previously 88 mcg daily for a few months, and previously 75 mcg daily for many years. She has had fluctuating thyroid hormone levels over the past year; TSH 0.17 uIU/mL (normal: 0.27-4.20) in May 2023, TSH 3.03 uIU/mL in June 2023, and TSH 12.50 uIU/mL in October 2023. She has a history of a thyroid biopsy many years ago; no recent thyroid imaging. No history of radiation exposure. Father with history of thyroid disease, unknown type. No family history of thyroid cancer.

## 2025-05-23 NOTE — REVIEW OF SYSTEMS
[Fatigue] : fatigue [Recent Weight Gain (___ Lbs)] : no recent weight gain [Recent Weight Loss (___ Lbs)] : no recent weight loss [Shortness Of Breath] : no shortness of breath [Joint Pain] : joint pain [Poor Balance] : steady state balance [Negative] : Heme/Lymph

## 2025-05-23 NOTE — HISTORY OF PRESENT ILLNESS
[FreeTextEntry1] : Ms. ESPARZA is a 77 year old female with pmhx of hypothyroidism, psoriatic arthritis and thromboembolic disease who presents for follow-up.  ***Patient refused to come back to exam room until completed phone call, creating late start to visit, so modified time of visit.  Additionally refused assistance from multiple MA staff***  Patient of Dr Samuel, last (initial) visit, 12/16/23 Last (initial) visit with myself-- 02/13/2024  Interval change: Since last visit, continues levothyroxine 100mcg QAM and discontinued Liothyronine regimen Has been getting refills by PCP, no recent TFTs, per patient Most recent TFTs provided by patient from July 2024 performed at [facility], as below. On file, most recently from April 2024 Hospitalized in February 2025 for PE Endorses ++ fatigue.  Same since last year, not worsened Patient is concerned of cortisol as etiology of fatigue, which she has not yet evaluated from order provided with last visit. Endorses better sleep patterns that she is waking earlier and desires proceeding for morning labs Endorses need for dental work and follow-up, "bad teeth" with broken tooth yesterday, uncertain of need for extraction/implantation   1. Osteoporosis Postmenopausal since  12/2023 Diagnosed with osteoporosis from DEXA in 12/2023 with   Does not believe having been diagnosed in the past or prior DEXA  No history of fracture  No past treatment   Current calcium and vitamin D intake includes: Dietary sources: "I am a milk drinker", but seeing if dairy upsets GI tract (increase bm) + cheese daily Supplements: vitD, supplements daily  Weight bearing exercise includes walking.  Osteoporosis risk factors include: Postmenopausal status,  race, prior fracture, falls, height loss, small thin bones, tobacco use, excessive alcohol, anorexia, family history, vitamin D deficiency, corticosteroid use, seizure medications, malabsorption, hyperparathyroidism, hyperthyroidism. NEGATIVE EXCEPT: postmenopausal status,  race   2. Hypothyroidism She was diagnosed with thyrotoxicosis during college in the setting of fatigue and weight gain and received propylthiouracil for a period of time. She was subsequently diagnosed with hypothyroidism in her fifties in [place]. She has been taking levothyroxine 100 mcg daily since October 2023, previously 88 mcg daily for a few months, and previously 75 mcg daily for many years. She has had fluctuating thyroid hormone levels over the past year; TSH 0.17 uIU/mL (normal: 0.27-4.20) in May 2023, TSH 3.03 uIU/mL in June 2023, and TSH 12.50 uIU/mL in October 2023. She has a history of a thyroid biopsy many years ago; no recent thyroid imaging. No history of radiation exposure. Father with history of thyroid disease, unknown type. No family history of thyroid cancer.

## 2025-05-23 NOTE — ASSESSMENT
[FreeTextEntry1] : 1. Hypothyroidism.  Current regimen: Levothyroxine 100 mcg daily She has had fluctuating thyroid hormone requirements. Of note, her weight-based dose of levothyroxine is approximately 109 mcg daily.  Last TFT on file from April 2024 euthyroid on above regimen No improvement of fatigue on combination therapy, so discontinued LT3 in 2024. -- continue LT4 100mcg  2. Fatigue Plan was to evaluate cortisol from visit in February 2024 for fatigue evaluation, not yet performed r/t patient barrier for AM labs. Desires pursuing this evaluation at this time, given too late in day to perform ACTH stim testing while in office -- early morning labs ordered  3. Osteoporosis *Not discussed with visit today r/t time constraint and patients focus on fatigue* Diagnosed from DEXA performed 12/20/2023 revealing left radius T-score of -3.8, Left hip T score -2.7, Left FN T-score -3, and LS T-score of -1.1 No history of fracture No past treatment for osteoporosis Needs ongoing dental work   Perform early morning labs next week, given time constraint of my maternity leave  Khushboo Mena, MS, FN-BC, [facility] 05/16/2025

## 2025-05-23 NOTE — ASSESSMENT
[FreeTextEntry1] : 1. Hypothyroidism.  Current regimen: Levothyroxine 100 mcg daily She has had fluctuating thyroid hormone requirements. Of note, her weight-based dose of levothyroxine is approximately 109 mcg daily.  Last TFT on file from April 2024 euthyroid on above regimen No improvement of fatigue on combination therapy, so discontinued LT3 in 2024. -- continue LT4 100mcg  2. Fatigue Plan was to evaluate cortisol from visit in February 2024 for fatigue evaluation, not yet performed r/t patient barrier for AM labs. Desires pursuing this evaluation at this time, given too late in day to perform ACTH stim testing while in office -- early morning labs ordered  3. Osteoporosis *Not discussed with visit today r/t time constraint and patients focus on fatigue* Diagnosed from DEXA performed 12/20/2023 revealing left radius T-score of -3.8, Left hip T score -2.7, Left FN T-score -3, and LS T-score of -1.1 No history of fracture No past treatment for osteoporosis Needs ongoing dental work   Perform early morning labs next week, given time constraint of my maternity leave  Khushboo Mena, MS, FN-BC, [facility] 05/16/2025

## 2025-06-11 NOTE — HISTORY OF PRESENT ILLNESS
[TextBox_4] : 77 yo F h/o recurrent VTE (DVT and PE; most recently in 2/2025), Psoriatic arthritis, Raynaud's, hypothyroidism, chronic fatigue, JAMIE (prior sleep study at [facility], never started on CPAP), GERD, goiter, UTI. Ms. Shepherd was seen by Dr. Mendenhall in 1/2024 for a RUL lung nodule 8 mm in size. Her visit was additionally notable for cough for which she was treated with nasal ICS. A repeat CT chest was obtained in 3/2024; in my review with thoracic radiology, the RUL 8 mm nodule appeared new compared to prior but stable between 1/2024 and 3/2024. Patient showed up without an appointment. She needed a tooth repaired last week and had to go off Eliquis temporarily. She has overwhelming fatigue and can't function during the day. Is worried about post PE syndrome. She had a sleep study at [facility] a long time ago and does not have the results. Needs emergency financial assistance to pay for her storage because she is behind in her payments. Has been significantly limited by her chronic fatigue and leg pain.  Given sleep study was > 1 yr ago and worsening symptoms of fatigue, decision made to repeat sleep study.   6/11/25 - Telehealth visit to discuss sleep test results.

## 2025-06-26 NOTE — ASSESSMENT
[FreeTextEntry1] : I, Dr. Singh, personally performed the evaluation and management (E/M) services for this new patient who presents today with exacerbation of (an) existing condition(s). That E/M includes conducting the examination, assessing all new/exacerbated conditions, and establishing a new plan of care. Today, my ACP, Nessa Irwin, was here to observe my evaluation and management services for this new problem/exacerbated condition to be followed going forward.  PLAN; - CT of cervical neck to assess cervical joint, could benefit from steroid injection - We will call the pt with results - neck PT
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
## 2025-07-16 NOTE — REASON FOR VISIT
[FreeTextEntry2] : a surgical consultation concerning a NTMNG, mild compressive symptoms, possible OSAS, globus. [FreeTextEntry1] : Referred by Mary Samuel MD Endocrinologist, PCP is [facility]
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
## 2025-07-16 NOTE — PROCEDURE
[de-identified] : Verbal informed consent was obtained for fiber optic laryngoscopy.  Findings: The nasal septum is minimally deviated to the left. The turbinates are congested. There are no masses or polyps, and the nasal mucosa and secretions are normal. The choanae and posterior nasopharynx are normal without masses or drainage. The Eustachian tube orifices appear patent. The pharynx, including the posterior and lateral pharyngeal walls, the vallecula and base of tongue are normal without ulcerations, lesions or masses. The hypopharynx including the pyriform sinuses open well without pooling of secretions, mucosal lesions or masses. The supraglottic larynx including the epiglottis, petiole, arytenoids, glossoepiglottic, aryepiglottic and pharyngoepiglottic folds are normal without mucosal lesions, ulcerations or masses. The glottis reveals normal false vocal folds. The true vocal folds are glistening white, tense and of equal length, without paralysis, having symmetric mobility on adduction and abduction. There are no mucosal lesions, nodules, cysts, erythroplasia or leukoplakia. The posterior cricoid area has healthy pink mucosa in the interarytenoid area and esophageal inlet. There is slight thickening/pachydermia of the interarytenoid mucosa suggestive of posterior laryngitis from laryngopharyngeal acid reflux disease. The trachea is clear without narrowing in the immediate subglottic region, without deviation or lesions. ------------------------------------------------------------------------------------------- [de-identified] : nontoxic MNG

## 2025-07-16 NOTE — DATA REVIEWED
[de-identified] : see HPI  [de-identified] : see HPI  [de-identified] : Endocrinologist notes reviewed.

## 2025-07-16 NOTE — CONSULT LETTER
[FreeTextEntry3] :  Harsha Lewis M.D., FACS, [facility] Director [facility] Certified in Thyroid/Parathyroid/Neck Ultrasound, TODD/ TAMRA , Department of Otolaryngology [facility] [employer] [facility] at [facility]

## 2025-07-16 NOTE — PROCEDURE
[de-identified] : Verbal informed consent was obtained for fiber optic laryngoscopy.  Findings: The nasal septum is minimally deviated to the left. The turbinates are congested. There are no masses or polyps, and the nasal mucosa and secretions are normal. The choanae and posterior nasopharynx are normal without masses or drainage. The Eustachian tube orifices appear patent. The pharynx, including the posterior and lateral pharyngeal walls, the vallecula and base of tongue are normal without ulcerations, lesions or masses. The hypopharynx including the pyriform sinuses open well without pooling of secretions, mucosal lesions or masses. The supraglottic larynx including the epiglottis, petiole, arytenoids, glossoepiglottic, aryepiglottic and pharyngoepiglottic folds are normal without mucosal lesions, ulcerations or masses. The glottis reveals normal false vocal folds. The true vocal folds are glistening white, tense and of equal length, without paralysis, having symmetric mobility on adduction and abduction. There are no mucosal lesions, nodules, cysts, erythroplasia or leukoplakia. The posterior cricoid area has healthy pink mucosa in the interarytenoid area and esophageal inlet. There is slight thickening/pachydermia of the interarytenoid mucosa suggestive of posterior laryngitis from laryngopharyngeal acid reflux disease. The trachea is clear without narrowing in the immediate subglottic region, without deviation or lesions. ------------------------------------------------------------------------------------------- [de-identified] : nontoxic MNG

## 2025-07-16 NOTE — PHYSICAL EXAM
[TextEntry] : Focused Head and Neck Examination:  General Appearance: The patient has a normal appearance (head and face), able to communicate with normal speech and is a well-groomed, well-developed, well-nourished, mildly overweight female in no apparent distress.  Breathing was silent and not labored. The patient was alert had normal affect and oriented to person, place, and time. The patient had normal facial and neck skin with normal facial symmetry, strength and motion, no visible/ palpable facial masses or sinus tenderness.  Otologic Exam: The pinnae and bilateral external ear canals were without lesions and clear after bilateral cerumen removal.  The tympanic membranes were normal bilaterally without perforation and demonstrated normal mobility. There was no evidence of middle ear effusion or infection. Hearing is grossly normal to finger rub.   External Nasal Exam: The external nose was normal in appearance without lesions or deformity.    Intranasal Exam: There were no mucosal lesions, discolorations, nasal polyps, or masses. The nasal septum was intact without perforations. The nasal septum was deviated slightly to the left. The inferior turbinates were very congested.  The middle turbinates were congested.  The middle meatus was clear, and the secretions were normal but thick.  Nasopharynx: There were no visible masses, mucosal ulcerations, or other lesions.  Secretions were normal.   Oral cavity: The patient's lips and vermillion border were normal without lesions, ulcerations or discolorations. Dentition was poor with multiple carious teeth and extractions, the gums were normal, the oral mucosa was normal and there were no lesions, discolorations, ulcerations, erythema, or leukoplakia.  The floor of mouth was without lesions or palpable calculi. The oral tongue has normal mobility, without palpable or visible lesions, ulcerations, nodularity or tenderness.  All surfaces including lateral, ventral and dorsal mucosa examined and palpated. Expressed salivary flow was normal.  Stensens ducts are without palpable calculi.    Oropharynx: The tongue base was without palpable lesions, the soft palate was normal without lesions, the hard palate was normal without lesions, ulcerations, nodularity or discolorations.  The palatine tonsils have been removed, and the lingual tonsils were normal.  Pharynx: The lateral and posterior pharyngeal walls were intact without mucosal lesions, discolorations, ulcerations, or masses.    Larynx and Hypopharynx: Flexible fiber optic laryngoscopy was performed with topical anesthesia using 4% lidocaine and 0.5 % Oxymetazoline on cotton pledgets. More details of the exam are outlined in my procedure note but this examination revealed normal, symmetric vocal fold mobility and normal mucosa without vocal fold lesions, discolorations, or ulcerations.  There was mild to slight posterior pachydermia consistent with GERD.  The epiglottis and false vocal folds were normal without mucosal lesions. The piriform sinuses opened well and there were no lesions or pooling of saliva. The trachea was clear without narrowing in the immediate subglottic area and in midline position without lesions.    Neck Exam: There was no palpable lymphadenopathy or bruits in the central or lateral adam drainage basins levels I-VI.  There was no asymmetry, pulsatile masses or nodules. The parotid and submandibular glands were not enlarged or tender and without palpable nodules or mass.  The temporomandibular joints were normal bilaterally without deviation/subluxation/click/tenderness or crepitus to palpation.  There is tenderness and muscle spasm over the left paraspinal muscles.   Thyroid Examination: The thyroid gland was not enlarged, nontender and without palpable nodules.  Neurological Exam: Cranial nerves II through XII were intact.  There was no visible tremor. Gross motor and sensory functions are normal.  A Chvostek sign was not present.  Ocular Exam: Pupils were equal and responsive to light.  A cataract is noted in the right eye but absent in the left.  Extraocular movements and gaze were normal. The sclera and conjunctiva were normal and anicteric.  Nystagmus was absent. No sign of erythema, ulcerations or lesions.    Respiratory: Respiratory effort is normal with symmetric chest expansion and no retractions or use of accessory muscles.    Cardiovascular: Extremities are normal with strong pulses, normal temperature, and no edema.
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
## 2025-07-16 NOTE — REASON FOR VISIT
[FreeTextEntry2] : a surgical consultation concerning a NTMNG, mild compressive symptoms, possible OSAS, globus. [FreeTextEntry1] : Referred by Mary Samuel MD Endocrinologist, PCP is [facility]

## 2025-07-16 NOTE — DATA REVIEWED
[de-identified] : see HPI  [de-identified] : see HPI  [de-identified] : Endocrinologist notes reviewed.